# Patient Record
(demographics unavailable — no encounter records)

---

## 2024-11-18 NOTE — ASSESSMENT
[FreeTextEntry1] : 11/18/2024: B/L Knee x-rays, 4 views, reveal 70% medial joint space loss on right knee and 50% on left knee. R ankle x-rays, 3v, normal Underlying pathology reviewed and treatment options discussed. Start PT and HEP to improve mechanics and reduce pain. Activity modification as tolerated. Prescribed mobic. The r/b/a of future visco injections were discussed. Will request auth. Questions addressed. Follow up with Dr. Chandler for ankle.   The documentation recorded by the scribe accurately reflects the service I personally performed and the decisions made by me. I, Stalin Berrios, attest that this documentation has been prepared under the direction and in the presence of Provider Manoj Abrams MD.   The patient was seen by Manoj Abrams MD.

## 2024-11-18 NOTE — HISTORY OF PRESENT ILLNESS
[Gradual] : gradual [Localized] : localized [Physical therapy] : physical therapy [Sitting] : sitting [Standing] : standing [Walking] : walking [Exercising] : exercising [Stairs] : stairs [Full time] : Work status: full time [de-identified] : 11/18/2024: 62 y/o male presents with bilateral knee pain. He was seen in 8/2024, when x-rays were taken and PT was recommended. He does note moderate relief with PT so far. States similar symptoms have started to occur in the right knee. No specific injury. He also notes "cramping" in the right ankle. History of right knee arthroscopy in 2022. 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 [] : no [FreeTextEntry1] : knees and ankle [FreeTextEntry5] : cramping in ankle , and knee pain for about 2 weeks  Ankle pain is a level of 8 and rt knee pain is a 4 [FreeTextEntry6] : limping on the left  [FreeTextEntry9] : l knee  [de-identified] : getting up [de-identified] : JADEN lopez l knee [de-identified] : inventer/ [de-identified] : rt knee 2022

## 2025-01-23 NOTE — HISTORY OF PRESENT ILLNESS
[Gradual] : gradual [8] : 8 [2] : 2 [Localized] : localized [Shooting] : shooting [Ice] : ice [Full time] : Work status: full time [de-identified] : Pt. is a 61 year old male who presents for evaluation of both ankles. Denies trauma. Symptoms for over a year on the RT, about 6 months on the LT. Symptoms are intermittent in nature. WB without assistive device. No formal treatment to date.  [] : no [FreeTextEntry1] : ankles [FreeTextEntry5] : chronic ankle pain, no injury  [FreeTextEntry9] : Advil occasionally  [de-identified] : getting up after sitting , driving  [de-identified] : 11-18-24 [de-identified] : Dr. 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

## 2025-01-23 NOTE — DISCUSSION/SUMMARY
[de-identified] :  The patient has a chronic condition.  WBAT in supportive footwear. Rx for accommodative orthotics provided. Recommend a course of PT. Ice to affected area prn.  The patient was explained the options as well as benefits of over the counter verses prescription strength nonsteroidal anti-inflammatory medication. Prescription strength medication is recommended to suppress chronic inflammation. The patient opts for a prescription strength medication..nsaids

## 2025-01-23 NOTE — REVIEW OF SYSTEMS
[Joint Pain] : joint pain [Negative] : Heme/Lymph [FreeTextEntry9] : occ flare up, swelling on rt ankle

## 2025-01-23 NOTE — PHYSICAL EXAM
[Right] : right foot and ankle [Mild] : mild diffused ankle swelling [Left] : left foot and ankle [NL (40)] : plantar flexion 40 degrees [NL 30)] : inversion 30 degrees [NL (20)] : eversion 20 degrees [4___] : inversion 4[unfilled]/5 [5___] : eversion 5[unfilled]/5 [2+] : posterior tibialis pulse: 2+ [Normal] : saphenous nerve sensation normal [] : non-antalgic [de-identified] : Venous stasis changes.  [de-identified] : inversion 15 degrees [de-identified] : eversion 15 degrees

## 2025-01-27 NOTE — PHYSICAL EXAM
[Left] : left knee [Right] : right knee [] : no erythema [NL (140)] : flexion 140 degrees [NL (0)] : extension 0 degrees

## 2025-01-27 NOTE — ASSESSMENT
[FreeTextEntry1] : 11/18/2024: B/L Knee x-rays, 4 views, reveal 70% medial joint space loss on right knee and 50% on left knee. R ankle x-rays, 3v, normal Underlying pathology reviewed and treatment options discussed. Start PT and HEP to improve mechanics and reduce pain. Activity modification as tolerated. Prescribed mobic. The r/b/a of future visco injections were discussed. Will request auth. Questions addressed. Follow up with Dr. Chandler for ankle.  01/27/2025: Rt Knee x-rays, 4 views, reveals moderate to medial arthritis. LT knee x-rays, 4 views, reveals moderate to medial arthritis. Underlying pathology reviewed and treatment options discussed. Obtain MRI LT knee R/O meniscus tear. Activity modification as tolerated. Questions addressed. Follow up after MRI.   The documentation recorded by the scribe accurately reflects the service I personally performed and the decisions made by me. I, Archie Duckworthibe, attest that this documentation has been prepared under the direction and in the presence of Provider Manoj Abrams MD.   The patient was seen by Manoj Abrams MD.

## 2025-01-27 NOTE — HISTORY OF PRESENT ILLNESS
[Gradual] : gradual [9] : 9 [5] : 5 [Localized] : localized [Stabbing] : stabbing [Occasional] : occasional [Meds] : meds [Physical therapy] : physical therapy [Sitting] : sitting [Standing] : standing [Walking] : walking [Exercising] : exercising [Stairs] : stairs [Full time] : Work status: full time [de-identified] : 01/27/2025: Patient is here for a follow up of B/L knee pain (L>R) and Rt ankle pain for about 2 weeks. Hx of RT knee scope. PT has been helping.   11/18/2024: 62 y/o male presents with bilateral knee pain. He was seen in 8/2024, when x-rays were taken and PT was recommended. He does note moderate relief with PT so far. States similar symptoms have started to occur in the right knee. No specific injury. He also notes "cramping" in the right ankle. History of right knee arthroscopy in 2022. 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 [] : no [FreeTextEntry1] : knees /left >rt knee [FreeTextEntry5] : cramping in ankle , and knee pain for about 2 weeks  Ankle pain is a level of 8 and rt knee pain is a 4 [FreeTextEntry6] : limping on the left  [de-identified] : getting up, turning foot  [de-identified] : inventer/

## 2025-02-03 NOTE — HISTORY OF PRESENT ILLNESS
[Gradual] : gradual [Localized] : localized [Stabbing] : stabbing [Occasional] : occasional [Meds] : meds [Physical therapy] : physical therapy [Sitting] : sitting [Standing] : standing [Walking] : walking [Exercising] : exercising [Stairs] : stairs [Full time] : Work status: full time [2] : 2 [de-identified] : 02/03/2025: patient is here for follow up visit to review MRI results of LT knee. He took a slip on 01/30/2025 and injured his back and RT hand.  01/27/2025: Patient is here for a follow up of B/L knee pain (L>R) and Rt ankle pain for about 2 weeks. Hx of RT knee scope. PT has been helping.   11/18/2024: 60 y/o male presents with bilateral knee pain. He was seen in 8/2024, when x-rays were taken and PT was recommended. He does note moderate relief with PT so far. States similar symptoms have started to occur in the right knee. No specific injury. He also notes "cramping" in the right ankle. History of right knee arthroscopy in 2022. 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 [] : no [FreeTextEntry1] : left knee today [FreeTextEntry6] : limping on the left  [de-identified] : getting up, turning foot  [de-identified] : MRI [facility] [de-identified] : pt fell recently  [de-identified] : inventer/

## 2025-02-03 NOTE — DATA REVIEWED
[MRI] : MRI [Left] : left [Knee] : knee [Report was reviewed and noted in the chart] : The report was reviewed and noted in the chart [I independently reviewed and interpreted images and report] : I independently reviewed and interpreted images and report [FreeTextEntry1] : MRI of LT knee [facility] 01/28/2025:  1. complex medial meniscal tearing with blunting and medial extrusion of the body. 2. medial compartment arthrosis and chondromalacia patella without subchondral edema. 3. mild chronic MCL sprain with laxity. 4. Mild patellofemoral effusion and synovitis.  5. no acute fracture, lateral meniscal tear or loose body.  6. 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

## 2025-02-03 NOTE — ASSESSMENT
[FreeTextEntry1] : 11/18/2024: B/L Knee x-rays, 4 views, reveal 70% medial joint space loss on right knee and 50% on left knee. R ankle x-rays, 3v, normal Underlying pathology reviewed and treatment options discussed. Start PT and HEP to improve mechanics and reduce pain. Activity modification as tolerated. Prescribed mobic. The r/b/a of future visco injections were discussed. Will request auth. Questions addressed. Follow up with Dr. Chandler for ankle.  01/27/2025: Rt Knee x-rays, 4 views, reveals moderate to medial arthritis. LT knee x-rays, 4 views, reveals moderate to medial arthritis. Underlying pathology reviewed and treatment options discussed. Obtain MRI LT knee R/O meniscus tear. Activity modification as tolerated. Questions addressed. Follow up after MRI.  02/03/2025: MRI of LT knee reviewed.  RT hand x-ray, 2 views, reveals arthritic changes in wrist. C-spine x-ray, 4 views, reveals hardware in c-spine from previous surgery-normal. L spine x-ray, 2 views, reveals hardware in l-spine from previous surgery-normal.  Underlying pathology and treatment options discussed.  Activity modification as tolerated.  Surgical options discussed for medial meniscal tear scope.  Surgical womack will get in touch. Questions addressed.  The documentation recorded by the scribe accurately reflects the service I personally performed and the decisions made by me. I, Bernie William, Archieibe, attest that this documentation has been prepared under the direction and in the presence of Provider Manoj Abrams MD.   The patient was seen by Manoj Abrams MD.

## 2025-02-03 NOTE — PHYSICAL EXAM
[Left] : left knee [NL (140)] : flexion 140 degrees [NL (0)] : extension 0 degrees [Right] : right hand [Metacarpal] : metacarpal [] : no erythema

## 2025-03-03 NOTE — HISTORY OF PRESENT ILLNESS
[Gradual] : gradual [2] : 2 [Localized] : localized [Stabbing] : stabbing [Occasional] : occasional [Meds] : meds [Physical therapy] : physical therapy [Sitting] : sitting [Standing] : standing [Walking] : walking [Exercising] : exercising [Stairs] : stairs [Full time] : Work status: full time [de-identified] : 03/03/2025: Patient reports for POV #1. Had LT knee arthroscopy with partial medial meniscectomy and partial synovectomy on 02/25/2025. Patient reports he is feeling better.   02/03/2025: patient is here for follow up visit to review MRI results of LT knee. He took a slip on 01/30/2025 and injured his back and RT hand.  01/27/2025: Patient is here for a follow up of B/L knee pain (L>R) and Rt ankle pain for about 2 weeks. Hx of RT knee scope. PT has been helping.   11/18/2024: 62 y/o male presents with bilateral knee pain. He was seen in 8/2024, when x-rays were taken and PT was recommended. He does note moderate relief with PT so far. States similar symptoms have started to occur in the right knee. No specific injury. He also notes "cramping" in the right ankle. History of right knee arthroscopy in 2022. 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 [] : no [FreeTextEntry1] : left knee today [de-identified] : getting up, turning foot  [de-identified] : 2-25-25 [de-identified] : inventer/ [de-identified] : 2-25-25

## 2025-03-03 NOTE — PHYSICAL EXAM
[Left] : left knee [Right] : right knee [NL (140)] : flexion 140 degrees [NL (0)] : extension 0 degrees [] : no erythema [FreeTextEntry9] : ROM was not assessed.  [de-identified] : Strength was not assessed.

## 2025-03-03 NOTE — ASSESSMENT
[FreeTextEntry1] : 11/18/2024: B/L Knee x-rays, 4 views, reveal 70% medial joint space loss on right knee and 50% on left knee. R ankle x-rays, 3v, normal Underlying pathology reviewed and treatment options discussed. Start PT and HEP to improve mechanics and reduce pain. Activity modification as tolerated. Prescribed mobic. The r/b/a of future visco injections were discussed. Will request auth. Questions addressed. Follow up with Dr. Chandler for ankle.  01/27/2025: Rt Knee x-rays, 4 views, reveals moderate to medial arthritis. LT knee x-rays, 4 views, reveals moderate to medial arthritis. Underlying pathology reviewed and treatment options discussed. Obtain MRI LT knee R/O meniscus tear. Activity modification as tolerated. Questions addressed. Follow up after MRI.  02/03/2025: MRI of LT knee reviewed.  RT hand x-ray, 2 views, reveals arthritic changes in wrist. C-spine x-ray, 4 views, reveals hardware in c-spine from previous surgery-normal. L spine x-ray, 2 views, reveals hardware in l-spine from previous surgery-normal.  Underlying pathology and treatment options discussed.  Activity modification as tolerated.  Surgical options discussed for medial meniscal tear scope.  Surgical womack will get in touch. Questions addressed.  03/03/2025: POV #1 -  LT knee arthroscopy with partial medial meniscectomy and partial synovectomy He is doing well.  Sutures removed and steri strips applied.  Start PT and HEP to improve mechanics and reduce pain. Questions addressed.  Follow up in 4 weeks.   The documentation recorded by the scribe accurately reflects the service I personally performed and the decisions made by me. I, Stalin Duckworth, attest that this documentation has been prepared under the direction and in the presence of Provider Manoj Abrams MD.   The patient was seen by Manoj Abrams MD.

## 2025-03-31 NOTE — ASSESSMENT
[FreeTextEntry1] : 11/18/2024: B/L Knee x-rays, 4 views, reveal 70% medial joint space loss on right knee and 50% on left knee. R ankle x-rays, 3v, normal Underlying pathology reviewed and treatment options discussed. Start PT and HEP to improve mechanics and reduce pain. Activity modification as tolerated. Prescribed mobic. The r/b/a of future visco injections were discussed. Will request auth. Questions addressed. Follow up with Dr. Chandler for ankle.  01/27/2025: Rt Knee x-rays, 4 views, reveals moderate to medial arthritis. LT knee x-rays, 4 views, reveals moderate to medial arthritis. Underlying pathology reviewed and treatment options discussed. Obtain MRI LT knee R/O meniscus tear. Activity modification as tolerated. Questions addressed. Follow up after MRI.  02/03/2025: MRI of LT knee reviewed.  RT hand x-ray, 2 views, reveals arthritic changes in wrist. C-spine x-ray, 4 views, reveals hardware in c-spine from previous surgery-normal. L spine x-ray, 2 views, reveals hardware in l-spine from previous surgery-normal.  Underlying pathology and treatment options discussed.  Activity modification as tolerated.  Surgical options discussed for medial meniscal tear scope.  Surgical womack will get in touch. Questions addressed.  03/03/2025: POV #1 -  LT knee arthroscopy with partial medial meniscectomy and partial synovectomy He is doing well.  Sutures removed and steri strips applied.  Start PT and HEP to improve mechanics and reduce pain. Questions addressed.  Follow up in 4 weeks.   03/31/2025: POV #2- LT knee arthroscopy with partial medial meniscectomy.  Pre-op pain is gone.  He will continue with PT.  Questions answered.  Follow up PRN.   The documentation recorded by the scribe accurately reflects the service I personally performed and the decisions made by me. I, Stalin Duckworth, attest that this documentation has been prepared under the direction and in the presence of Provider Manoj Abrams MD.   The patient was seen by Manoj Abrams MD.

## 2025-03-31 NOTE — PHYSICAL EXAM
[Left] : left knee [Right] : right knee [NL (140)] : flexion 140 degrees [NL (0)] : extension 0 degrees [] : no erythema [de-identified] : Strength was not assessed.

## 2025-03-31 NOTE — HISTORY OF PRESENT ILLNESS
[Gradual] : gradual [2] : 2 [Localized] : localized [Stabbing] : stabbing [Occasional] : occasional [Meds] : meds [Physical therapy] : physical therapy [Sitting] : sitting [Standing] : standing [Walking] : walking [Exercising] : exercising [Stairs] : stairs [Full time] : Work status: full time [de-identified] : 03/31/2025: Patient is here for POV #2 for LT knee arthroscopy and partial medial meniscectomy. He c/o of no pain.   03/03/2025: Patient reports for POV #1. Had LT knee arthroscopy with partial medial meniscectomy and partial synovectomy.   02/25/2025. Patient reports he is feeling better.   02/03/2025: patient is here for follow up visit to review MRI results of LT knee. He took a slip on 01/30/2025 and injured his back and RT hand.  01/27/2025: Patient is here for a follow up of B/L knee pain (L>R) and Rt ankle pain for about 2 weeks. Hx of RT knee scope. PT has been helping.   11/18/2024: 60 y/o male presents with bilateral knee pain. He was seen in 8/2024, when x-rays were taken and PT was recommended. He does note moderate relief with PT so far. States similar symptoms have started to occur in the right knee. No specific injury. He also notes "cramping" in the right ankle. History of right knee arthroscopy in 2022. 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 [] : no [FreeTextEntry1] : left knee today [FreeTextEntry6] : sore turning to right [de-identified] : getting up, turning foot  [de-identified] : 2-25-25 [de-identified] : inventer/ [de-identified] : 2-25-25

## 2025-07-14 NOTE — PHYSICAL EXAM
[Left] : left knee [Right] : right knee [NL (140)] : flexion 140 degrees [NL (0)] : extension 0 degrees [] : no erythema [FreeTextEntry8] : Mild medial joint line tenderness.

## 2025-07-14 NOTE — ASSESSMENT
[FreeTextEntry1] : 11/18/2024: B/L Knee x-rays, 4 views, reveal 70% medial joint space loss on right knee and 50% on left knee. R ankle x-rays, 3v, normal Underlying pathology reviewed and treatment options discussed. Start PT and HEP to improve mechanics and reduce pain. Activity modification as tolerated. Prescribed mobic. The r/b/a of future visco injections were discussed. Will request auth. Questions addressed. Follow up with Dr. Chandler for ankle.  01/27/2025: Rt Knee x-rays, 4 views, reveals moderate to medial arthritis. LT knee x-rays, 4 views, reveals moderate to medial arthritis. Underlying pathology reviewed and treatment options discussed. Obtain MRI LT knee R/O meniscus tear. Activity modification as tolerated. Questions addressed. Follow up after MRI.  02/03/2025: MRI of LT knee reviewed.  RT hand x-ray, 2 views, reveals arthritic changes in wrist. C-spine x-ray, 4 views, reveals hardware in c-spine from previous surgery-normal. L spine x-ray, 2 views, reveals hardware in l-spine from previous surgery-normal.  Underlying pathology and treatment options discussed.  Activity modification as tolerated.  Surgical options discussed for medial meniscal tear scope.  Surgical womack will get in touch. Questions addressed.  03/03/2025: POV #1 -  LT knee arthroscopy with partial medial meniscectomy and partial synovectomy He is doing well.  Sutures removed and steri strips applied.  Start PT and HEP to improve mechanics and reduce pain. Questions addressed.  Follow up in 4 weeks.   03/31/2025: POV #2- LT knee arthroscopy with partial medial meniscectomy.  Pre-op pain is gone.  He will continue with PT.  Questions answered.  Follow up PRN.   07/14/2025: We reviewed his course.  His pain is from exacerbation from moving his furniture into his new home.  Prescribed Meloxicam 15 mg - I discussed the proper use of this medication and potential side effects. Activity modification as tolerated. Questions answered.  Follow up PRN.   The documentation recorded by the scribe accurately reflects the service I personally performed and the decisions made by me. I, Stalin Duckworth, attest that this documentation has been prepared under the direction and in the presence of Provider Manoj Abrams MD.   The patient was seen by Manoj Abrams MD.

## 2025-07-14 NOTE — HISTORY OF PRESENT ILLNESS
[Gradual] : gradual [Localized] : localized [Occasional] : occasional [Physical therapy] : physical therapy [Sitting] : sitting [Standing] : standing [Walking] : walking [Exercising] : exercising [Stairs] : stairs [Full time] : Work status: full time [Dull/Aching] : dull/aching [Sharp] : sharp [de-identified] : 07/14/2025: Patient states past month pain has been different and worse. He has been moving a lot of furniture around due to moving into another house.  03/31/2025: Patient is here for POV #2 for LT knee arthroscopy and partial medial meniscectomy. He c/o of no pain.   03/03/2025: Patient reports for POV #1. Had LT knee arthroscopy with partial medial meniscectomy and partial synovectomy.   02/25/2025. Patient reports he is feeling better.   02/03/2025: patient is here for follow up visit to review MRI results of LT knee. He took a slip on 01/30/2025 and injured his back and RT hand.  01/27/2025: Patient is here for a follow up of B/L knee pain (L>R) and Rt ankle pain for about 2 weeks. Hx of RT knee scope. PT has been helping.   11/18/2024: 62 y/o male presents with bilateral knee pain. He was seen in 8/2024, when x-rays were taken and PT was recommended. He does note moderate relief with PT so far. States similar symptoms have started to occur in the right knee. No specific injury. He also notes "cramping" in the right ankle. History of right knee arthroscopy in 2022. 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  [] : Post Surgical Visit: no [FreeTextEntry1] : left knee  [de-identified] : getting up, turning foot  [de-identified] : 2-25-25 [de-identified] : inventer/ [de-identified] : 2-25-25

## 2025-07-14 NOTE — DISCUSSION/SUMMARY
[de-identified] : The patient was advised of the diagnosis. The natural history of the pathology was explained in full to the patient in layman's terms. The risks and benefits of surgical and non-surgical treatment alternatives were explained in full to the patient. All questions were answered.